# Patient Record
Sex: MALE | Race: WHITE
[De-identification: names, ages, dates, MRNs, and addresses within clinical notes are randomized per-mention and may not be internally consistent; named-entity substitution may affect disease eponyms.]

---

## 2018-06-28 ENCOUNTER — HOSPITAL ENCOUNTER (OUTPATIENT)
Dept: HOSPITAL 47 - RADUSWWP | Age: 68
Discharge: HOME | End: 2018-06-28
Payer: MEDICARE

## 2018-06-28 DIAGNOSIS — I70.0: Primary | ICD-10-CM

## 2018-06-28 PROCEDURE — 93979 VASCULAR STUDY: CPT

## 2018-06-28 NOTE — US
EXAMINATION TYPE: US duplex aorta

 

DATE OF EXAM: 6/28/2018

 

COMPARISON: NONE

 

CLINICAL HISTORY: Z13.9 ENCOUNTER FOR SCREENING; father had AAA; patient has controlled HTN

 

EXAM MEASUREMENTS:

 

Abdominal Aorta:

** Proximal:  2.4cm A/P

** Mid:  2.0cm A/P

** Distal:  2.4cm Transverse

** Bifurcation:  Right KEITH 1.2cm Transverse  and Left KEITH 1.1cm Transverse and arteries best  seen in
 transverse view due to overlying bowel gas in longitudinal view.

 

Aorta size appears wnl. Color flow patency is documented in abdominal aorta and Common Iliac Arteries
.

 

 

 

IMPRESSION: 

1. No evidence for abdominal aortic aneurysm. Mild atheromatous change noted.

## 2019-08-23 ENCOUNTER — HOSPITAL ENCOUNTER (OUTPATIENT)
Dept: HOSPITAL 47 - ORWHC2ENDO | Age: 69
Discharge: HOME | End: 2019-08-23
Attending: INTERNAL MEDICINE
Payer: MEDICARE

## 2019-08-23 VITALS — BODY MASS INDEX: 28.1 KG/M2

## 2019-08-23 VITALS — HEART RATE: 60 BPM | SYSTOLIC BLOOD PRESSURE: 119 MMHG | DIASTOLIC BLOOD PRESSURE: 73 MMHG

## 2019-08-23 VITALS — TEMPERATURE: 97.3 F | RESPIRATION RATE: 16 BRPM

## 2019-08-23 DIAGNOSIS — D12.2: ICD-10-CM

## 2019-08-23 DIAGNOSIS — Z88.8: ICD-10-CM

## 2019-08-23 DIAGNOSIS — Z12.11: Primary | ICD-10-CM

## 2019-08-23 DIAGNOSIS — G47.33: ICD-10-CM

## 2019-08-23 DIAGNOSIS — I10: ICD-10-CM

## 2019-08-23 DIAGNOSIS — Z79.899: ICD-10-CM

## 2019-08-23 DIAGNOSIS — Z86.010: ICD-10-CM

## 2019-08-23 DIAGNOSIS — Z79.82: ICD-10-CM

## 2019-08-23 DIAGNOSIS — Z87.891: ICD-10-CM

## 2019-08-23 DIAGNOSIS — E78.5: ICD-10-CM

## 2019-08-23 DIAGNOSIS — Z79.84: ICD-10-CM

## 2019-08-23 DIAGNOSIS — E11.9: ICD-10-CM

## 2019-08-23 LAB
GLUCOSE BLD-MCNC: 143 MG/DL (ref 75–99)
GLUCOSE BLD-MCNC: 158 MG/DL (ref 75–99)

## 2019-08-23 PROCEDURE — 88305 TISSUE EXAM BY PATHOLOGIST: CPT

## 2019-08-23 PROCEDURE — 45385 COLONOSCOPY W/LESION REMOVAL: CPT

## 2019-08-23 NOTE — P.PCN
Date of Procedure: 08/23/19


Procedure(s) Performed: 


BRIEF HISTORY: Patient is a 68-year-old pleasant  male, scheduled for an 

elective colonoscopy as a part of evaluation of prior history of colon polyps.  

Last colonoscopy was 5 years ago.





PROCEDURE PERFORMED: Colonoscopy with snare polypectomy. 





PREOPERATIVE DIAGNOSIS: History of colon polyps. 





IV sedation per Anesthesia. 





PROCEDURE: After informed consent was obtained, the patient, was brought into 

the endoscopy unit. IV sedation was administered by Anesthesia under continuous 

monitoring.  Digital rectal examination was normal. Initially the Olympus CF-160

flexible video colonoscope was then inserted in the rectum, gradually advanced 

into the cecum without any difficulty. Careful examination was performed as the 

scope was gradually being withdrawn. Ileocecal valve and the appendiceal orifice

were visualized and appeared normal.  Prep was excellent. Mucosa of the cecum, 

was normal.  In the ascending colon there was a 7-8 mm broad-based ascending 

colon, transverse colon, descending colon, sigmoid colon, and rectum appeared 

normal. Retroflexion was performed in the rectum and no lesions were seen. The 

patient tolerated the procedure well. 





IMPRESSION: 


7-8 mm broad-based ascending colon polyp status post polypectomy


Rest of the colon appeared normal





RECOMMENDATIONS:  Findings of this examination were discussed with the patient 

as well as his family.  He was advised to follow with the biopsy results.  If 

the biopsy shows an adenoma he can have a repeat colonoscopy in 5 years.